# Patient Record
Sex: FEMALE | Race: WHITE | NOT HISPANIC OR LATINO | Employment: UNEMPLOYED | URBAN - METROPOLITAN AREA
[De-identification: names, ages, dates, MRNs, and addresses within clinical notes are randomized per-mention and may not be internally consistent; named-entity substitution may affect disease eponyms.]

---

## 2017-01-16 ENCOUNTER — OFFICE VISIT (OUTPATIENT)
Dept: UROLOGY | Facility: CLINIC | Age: 42
End: 2017-01-16
Payer: OTHER GOVERNMENT

## 2017-01-16 VITALS
HEART RATE: 72 BPM | DIASTOLIC BLOOD PRESSURE: 70 MMHG | BODY MASS INDEX: 38.98 KG/M2 | WEIGHT: 220 LBS | RESPIRATION RATE: 16 BRPM | SYSTOLIC BLOOD PRESSURE: 110 MMHG | HEIGHT: 63 IN

## 2017-01-16 DIAGNOSIS — N20.0 NEPHROLITHIASIS: Primary | ICD-10-CM

## 2017-01-16 DIAGNOSIS — N32.89 BLADDER SPASM: ICD-10-CM

## 2017-01-16 DIAGNOSIS — N39.0 RECURRENT UTI: ICD-10-CM

## 2017-01-16 LAB
BILIRUB SERPL-MCNC: ABNORMAL MG/DL
BLOOD URINE, POC: ABNORMAL
COLOR, POC UA: ABNORMAL
GLUCOSE UR QL STRIP: 1000
KETONES UR QL STRIP: ABNORMAL
LEUKOCYTE ESTERASE URINE, POC: ABNORMAL
NITRITE, POC UA: ABNORMAL
PH, POC UA: 6
PROTEIN, POC: ABNORMAL
SPECIFIC GRAVITY, POC UA: 1020
UROBILINOGEN, POC UA: ABNORMAL

## 2017-01-16 PROCEDURE — 99999 PR PBB SHADOW E&M-EST. PATIENT-LVL III: CPT | Mod: PBBFAC,,, | Performed by: UROLOGY

## 2017-01-16 PROCEDURE — 99214 OFFICE O/P EST MOD 30 MIN: CPT | Mod: S$PBB,,, | Performed by: UROLOGY

## 2017-01-16 PROCEDURE — 81002 URINALYSIS NONAUTO W/O SCOPE: CPT | Mod: PBBFAC | Performed by: UROLOGY

## 2017-01-16 PROCEDURE — 99213 OFFICE O/P EST LOW 20 MIN: CPT | Mod: PBBFAC | Performed by: UROLOGY

## 2017-01-16 RX ORDER — BLOOD-GLUCOSE METER
KIT MISCELLANEOUS
COMMUNITY
Start: 2017-01-08

## 2017-01-16 RX ORDER — ZOLPIDEM TARTRATE 5 MG/1
TABLET ORAL
COMMUNITY
Start: 2017-01-06

## 2017-01-16 NOTE — PROGRESS NOTES
Subjective:       Sterling Joshi is a 41 y.o. female who is an established patient who was self-referred for evaluation of nephrolithiasis.      She was seen in the ER in 10/16. CT showed punctate b/l stones - no obstruction. She reports long history of stones that has required multiple ESWL x 6 and two URS in the past for stones. Last procedure was 2 years ago (2014). She has passed many other stones spontaneously. She started making stones at age 16. She has undergone workup for stone formation in the past per her report. She took HCTZ in the past but caused dehydration and glucose issues.     She has multiple other complaints today including slow urine stream, recurrent UTIs, and possible underactive bladder. She was given Detrol by previous urologist but did not take this as she is concerned about underactivity, not OAB. She reported slower stream when she took this. She feels that she has cramping pain for last 4-5 months. She admits to straining with urination. Denies frequency. She has urgency but small volume urine. Nocturia x 1. Denies constipation.       She was given trial of Ditropan XL 5mg at initial visit for bladder spasms but did not note improvement of symptoms.  Her main complaint is cramping abdominal pain. She is understandably frustrated that no cause has been found. She was seen in ER 12/16 - UCx negative, CT with mild L hydro.     She has DM1 and is on insulin pump. Recent A1c - 7 per pt report.     PVR (bladder scan) initial visit - 0cc  PVR (bladder scan) today - 26cc        The following portions of the patient's history were reviewed and updated as appropriate: allergies, current medications, past family history, past medical history, past social history, past surgical history and problem list.    Review of Systems  Constitutional: no fever or chills  ENT: no nasal congestion or sore throat  Respiratory: no cough or shortness of breath  Cardiovascular: no chest pain or  "palpitations  Gastrointestinal: no nausea or vomiting, tolerating diet  Genitourinary: as per HPI  Hematologic/Lymphatic: no easy bruising or lymphadenopathy  Musculoskeletal: no arthralgias or myalgias  Skin: no rashes or lesions  Neurological: no seizures or tremors  Behavioral/Psych: no auditory or visual hallucinations       Objective:    Vitals:   Visit Vitals    /70    Pulse 72    Resp 16    Ht 5' 3" (1.6 m)    Wt 99.8 kg (220 lb)    BMI 38.97 kg/m2       Physical Exam   General: well developed, well nourished in no acute distress  Head: normocephalic, atraumatic  Neck: supple, trachea midline, no obvious enlargement of thyroid  HEENT: EOMI, mucus membranes moist, sclera anicteric, no hearing impairment  Lungs: symmetric expansion, non-labored breathing  Cardiovascular: regular rate and rhythm, normal pulses  Abdomen: soft, non tender, non distended, no palpable masses, no hepatosplenomegaly, no hernias, no CVA tenderness  Musculoskeletal: no peripheral edema, normal ROM in bilateral upper and lower extremities  Lymphatics: no cervical or inguinal lymphadenopathy  Skin: no rashes or lesions  Neuro: alert and oriented x 3, no gross deficits  Psych: normal judgment and insight, normal mood/affect and non-anxious  Genitourinary:   patient declined exam      Lab Review   Urine analysis today in clinic shows positive for glucose 1000    Lab Results   Component Value Date    WBC 8.83 12/21/2016    HGB 15.4 12/21/2016    HCT 45.5 12/21/2016    MCV 87 12/21/2016     12/21/2016     Lab Results   Component Value Date    CREATININE 0.9 12/21/2016    BUN 11 12/21/2016         Imaging  Images and reports were personally reviewed by me and discussed with patient  CT reviewed       Assessment/Plan:      1. Nephrolithiasis    - Long history of stones   - Punctate stones on recent CT 10/16   - Recommend q6mth KUB for now   - Consider repeat 24 hour urine     2. Bladder spasm    - Unsure etiology of pain   - " Related to urgency   - PVR great - 0cc   - Trial of Ditropan XL 5mg for possible spasms - no improvement   - Slow stream - recommend UDS   - Uribel/Cystex not helpful   - Pain in SPT and flank are main complaint now   - Cystoscopy     3. Recurrent UTI    - Tight glucose control to prevent glucosuria   - No significant stones currently acting as nidus   - UA clear today (besides glucose)   - UCx with symptoms        Follow up in 3-4 weeks for cystoscopy

## 2017-01-16 NOTE — MR AVS SNAPSHOT
[facility]  [address] [place] [address] [address]  [place] [address]  Phone: [phone]                  Sterling Joshi   2017 2:20 PM   Office Visit    Description:  Female : 1975   Provider:  Meagan Sun MD   Department:  [facility]           Reason for Visit     Follow-up           Diagnoses this Visit        Comments    Nephrolithiasis    -  Primary     Bladder spasm         Recurrent UTI                To Do List           Goals (5 Years of Data)     None      Follow-Up and Disposition     Return in about 4 weeks (around 2017) for Cystoscopy.      [facility]     [facility] Care [facility] -  Assistance  Registered nurses in the [facility] provide clinical advisement, health education, appointment booking, and other advisory services.  Call for this free service at [phone].             Medications           Message regarding Medications     Verify the changes and/or additions to your medication regime listed below are the same as discussed with your clinician today.  If any of these changes or additions are incorrect, please notify your healthcare provider.        STOP taking these medications     BELSOMRA 10 mg Tab            Verify that the below list of medications is an accurate representation of the medications you are currently taking.  If none reported, the list may be blank. If incorrect, please contact your healthcare provider. Carry this list with you in case of emergency.           Current Medications     (No Medication Selected) Inject into the skin continuous. NovoLog insulin    dicyclomine (BENTYL) 20 mg tablet Take 1 tablet (20 mg total) by mouth 2 (two) times daily as needed.    FREESTYLE LITE STRIPS Strp     gabapentin (NEURONTIN) 600 MG tablet Take 300 mg by mouth 3 (three) times daily.    hydrocodone-acetaminophen 5-325mg (NORCO) 5-325 mg per tablet Take 1 tablet by mouth every 6 (six) hours as needed for Pain. Please  "do not drink alcohol, drive, operate heavy machinery, work or swim while taking this medication as it can cause mental impairment.    ibuprofen (ADVIL,MOTRIN) 800 MG tablet     insulin aspart (NOVOLOG) 100 unit/mL injection Inject 5 Units into the skin 3 (three) times daily before meals.    levothyroxine (SYNTHROID) 112 MCG tablet Take 112 mcg by mouth 2 (two) times daily.    loratadine (CLARITIN) 10 mg tablet Take 10 mg by mouth once daily.    metoclopramide HCl (REGLAN) 10 MG tablet Take 1 tablet (10 mg total) by mouth every 6 (six) hours as needed (Nausea/vomiting).    ondansetron (ZOFRAN-ODT) 4 MG TbDL Take 2 tablets (8 mg total) by mouth every 8 (eight) hours as needed (Nausea).    oxybutynin (DITROPAN-XL) 5 MG TR24 Take 1 tablet (5 mg total) by mouth once daily.    oxycodone-acetaminophen (PERCOCET) 5-325 mg per tablet     rosuvastatin (CRESTOR) 10 MG tablet Take 10 mg by mouth once daily.    zolpidem (AMBIEN) 5 MG Tab            Clinical Reference Information           Vital Signs - Last Recorded  Most recent update: 1/16/2017  3:03 PM by Kathy Vivar MA    BP Pulse Resp Ht Wt BMI    110/70 72 16 5' 3" (1.6 m) 99.8 kg (220 lb) 38.97 kg/m2      Blood Pressure          Most Recent Value    BP  110/70      Allergies as of 1/16/2017     Codeine    Morphine    Pcn [Penicillins]    Tramadol      Immunizations Administered on Date of Encounter - 1/16/2017     None      Orders Placed During Today's Visit     Future Labs/Procedures Expected by Expires    Cystoscopy  As directed 1/16/2018      "

## 2017-02-16 ENCOUNTER — PROCEDURE VISIT (OUTPATIENT)
Dept: UROLOGY | Facility: CLINIC | Age: 42
End: 2017-02-16
Payer: OTHER GOVERNMENT

## 2017-02-16 DIAGNOSIS — N32.89 BLADDER SPASM: ICD-10-CM

## 2017-02-16 PROCEDURE — 52000 CYSTOURETHROSCOPY: CPT | Mod: PBBFAC | Performed by: UROLOGY

## 2017-02-16 NOTE — PROCEDURES
"Cystoscopy  Date/Time: 2/16/2017 12:20 PM  Performed by: MAURICIO SINGH  Authorized by: MAURICIO SINGH     Consent Done?:  Yes (Written)  Time out: Immediately prior to procedure a "time out" was called to verify the correct patient, procedure, equipment, support staff and site/side marked as required.    Indications: hematuria    Indications comment:  SP pain  Position:  Dorsal lithotomy  Anesthesia:  Lidocaine jelly  Patient sedated?: No    Preparation: Patient was prepped and draped in usual sterile fashion      Scope type:  Flexible cystoscope  Stent inserted: No    Stent removed: No    External exam normal: Yes    Digital exam performed: No    Urethra normal: Yes  Bladder neck normal: Bladder neck normal   Bladder normal: Yes      Patient tolerance:  Patient tolerated the procedure well with no immediate complications     Normal cystoscopy. Widely open urethra meatus.     Passed some small stones recently, will bring in for analysis.       "

## 2017-02-16 NOTE — MR AVS SNAPSHOT
[facility]  [address]  [address]  [place] [address]  Phone: [phone]                  Sterling Joshi   2017 11:20 AM   Procedure visit    Description:  Female : 1975   Provider:  Meagan Sun MD   Department:  [facility]           Diagnoses this Visit        Comments    Bladder spasm                To Do List           Future Appointments        Provider Department Dept Phone    2017 1:20 PM Meagan Sun MD [facility] [phone]      Goals (5 Years of Data)     None      Follow-Up and Disposition     Return in about 2 months (around 2017) for Follow up on symptoms.      [facility]     [facility] Nurse Care [facility] -  Assistance  Registered nurses in the [facility] provide clinical advisement, health education, appointment booking, and other advisory services.  Call for this free service at [phone].             Medications           Message regarding Medications     Verify the changes and/or additions to your medication regime listed below are the same as discussed with your clinician today.  If any of these changes or additions are incorrect, please notify your healthcare provider.             Verify that the below list of medications is an accurate representation of the medications you are currently taking.  If none reported, the list may be blank. If incorrect, please contact your healthcare provider. Carry this list with you in case of emergency.           Current Medications     (No Medication Selected) Inject into the skin continuous. NovoLog insulin    dicyclomine (BENTYL) 20 mg tablet Take 1 tablet (20 mg total) by mouth 2 (two) times daily as needed.    FREESTYLE LITE STRIPS Strp     gabapentin (NEURONTIN) 600 MG tablet Take 300 mg by mouth 3 (three) times daily.    hydrocodone-acetaminophen 5-325mg (NORCO) 5-325 mg per tablet Take 1 tablet by mouth every 6 (six) hours as needed for Pain.  Please do not drink alcohol, drive, operate heavy machinery, work or swim while taking this medication as it can cause mental impairment.    ibuprofen (ADVIL,MOTRIN) 800 MG tablet     insulin aspart (NOVOLOG) 100 unit/mL injection Inject 5 Units into the skin 3 (three) times daily before meals.    levothyroxine (SYNTHROID) 112 MCG tablet Take 112 mcg by mouth 2 (two) times daily.    loratadine (CLARITIN) 10 mg tablet Take 10 mg by mouth once daily.    metoclopramide HCl (REGLAN) 10 MG tablet Take 1 tablet (10 mg total) by mouth every 6 (six) hours as needed (Nausea/vomiting).    ondansetron (ZOFRAN-ODT) 4 MG TbDL Take 2 tablets (8 mg total) by mouth every 8 (eight) hours as needed (Nausea).    oxybutynin (DITROPAN-XL) 5 MG TR24 Take 1 tablet (5 mg total) by mouth once daily.    oxycodone-acetaminophen (PERCOCET) 5-325 mg per tablet     rosuvastatin (CRESTOR) 10 MG tablet Take 10 mg by mouth once daily.    zolpidem (AMBIEN) 5 MG Tab            Clinical Reference Information           Allergies as of 2/16/2017     Codeine    Morphine    Pcn [Penicillins]    Tramadol      Immunizations Administered on Date of Encounter - 2/16/2017     None      Orders Placed During Today's Visit      Normal Orders This Visit    Cystoscopy       Language Assistance Services     ATTENTION: Language assistance services are available, free of charge. Please call [phone].      ATENCIÓN: Si habla kattañol, tiene a spencer disposición servicios gratuitos de asistencia lingüística. Llame al [phone].     [facility] Ý: N?u b?n nói Ti?ng Vi?t, có các d?ch v? h? tr? ngôn ng? mi?n phí dành cho b?n. G?i s? [phone].         [facility] complies with applicable Federal civil rights laws and does not discriminate on the basis of race, color, national origin, age, disability, or sex.

## 2017-04-26 ENCOUNTER — HOSPITAL ENCOUNTER (EMERGENCY)
Facility: OTHER | Age: 42
Discharge: HOME | End: 2017-04-26
Attending: EMERGENCY MEDICINE
Payer: OTHER GOVERNMENT

## 2017-04-26 VITALS
WEIGHT: 220 LBS | SYSTOLIC BLOOD PRESSURE: 154 MMHG | OXYGEN SATURATION: 100 % | BODY MASS INDEX: 38.98 KG/M2 | TEMPERATURE: 99 F | RESPIRATION RATE: 18 BRPM | DIASTOLIC BLOOD PRESSURE: 80 MMHG | HEART RATE: 100 BPM | HEIGHT: 63 IN

## 2017-04-26 DIAGNOSIS — T81.31XA POSTOPERATIVE WOUND DEHISCENCE, INITIAL ENCOUNTER: Primary | ICD-10-CM

## 2017-04-26 LAB — POCT GLUCOSE: 235 MG/DL (ref 70–110)

## 2017-04-26 PROCEDURE — 25000003 PHARM REV CODE 250: Performed by: EMERGENCY MEDICINE

## 2017-04-26 PROCEDURE — 99284 EMERGENCY DEPT VISIT MOD MDM: CPT

## 2017-04-26 RX ORDER — HYDROCODONE BITARTRATE AND ACETAMINOPHEN 5; 325 MG/1; MG/1
1 TABLET ORAL EVERY 4 HOURS PRN
Qty: 10 TABLET | Refills: 0 | Status: SHIPPED | OUTPATIENT
Start: 2017-04-26 | End: 2017-05-06

## 2017-04-26 RX ORDER — HYDROCODONE BITARTRATE AND ACETAMINOPHEN 5; 325 MG/1; MG/1
2 TABLET ORAL
Status: COMPLETED | OUTPATIENT
Start: 2017-04-26 | End: 2017-04-26

## 2017-04-26 RX ORDER — MUPIROCIN 20 MG/G
OINTMENT TOPICAL 3 TIMES DAILY
Qty: 15 G | Refills: 0 | Status: SHIPPED | OUTPATIENT
Start: 2017-04-26 | End: 2017-05-06

## 2017-04-26 RX ORDER — SULFAMETHOXAZOLE AND TRIMETHOPRIM 800; 160 MG/1; MG/1
1 TABLET ORAL 2 TIMES DAILY
Qty: 14 TABLET | Refills: 0 | Status: SHIPPED | OUTPATIENT
Start: 2017-04-26 | End: 2017-05-03

## 2017-04-26 RX ADMIN — HYDROCODONE BITARTRATE AND ACETAMINOPHEN 2 TABLET: 5; 325 TABLET ORAL at 03:04

## 2017-04-26 NOTE — ED TRIAGE NOTES
Pt c/o abd. Pain states she had surgery last month, states she is having pain near her incision, incision on left side as dehisced

## 2017-04-26 NOTE — ED PROVIDER NOTES
Encounter Date: 4/26/2017       History     Chief Complaint   Patient presents with    Drainage from Incision     ovaries removal one month ago states wound open mid abd, drainage white with tinge of blood      Review of patient's allergies indicates:   Allergen Reactions    Codeine Nausea And Vomiting    Morphine Hives    Pcn [penicillins] Nausea And Vomiting    Tramadol Itching     The history is provided by the patient.    41-year-old lady who is one month status post laparoscopic oophorectomy.  Patient says that she has 2 wounds on her abdomen that opened a few days ago.  She reports mild redness and swelling around the site and some drainage.  She is unsure whether she has fever.  Patient just got back in town from [place] today and did not contact her surgeon regarding this problem.  Patient's pain is moderate and  worsens when she moves.  She did not take anything for pain.  She denies nausea or vomiting.  No trauma.  Patient says that she has not done any heavy lifting.  Past Medical History:   Diagnosis Date    Chronic back pain     Degenerative disc disease     Diabetes mellitus     Ovarian cyst     Renal disorder     renal stones    Thyroid disease     hypothyroidism     Past Surgical History:   Procedure Laterality Date    CHOLECYSTECTOMY      HYSTERECTOMY      TONSILLECTOMY       Family History   Problem Relation Age of Onset    Diabetes Mother      Social History   Substance Use Topics    Smoking status: Former Smoker    Smokeless tobacco: None    Alcohol use No     Review of Systems   Constitutional: Negative for fever.   Gastrointestinal: Positive for abdominal pain. Negative for nausea and vomiting.   Skin: Positive for color change and wound.   All other systems reviewed and are negative.      Physical Exam   Initial Vitals   BP Pulse Resp Temp SpO2   04/26/17 1442 04/26/17 1441 04/26/17 1441 04/26/17 1441 04/26/17 1441   154/80 100 18 98.6 °F (37 °C) 100 %     Physical  Exam    Nursing note and vitals reviewed.  Constitutional: She appears well-developed and well-nourished. She is Obese . She appears distressed.   HENT:   Head: Normocephalic and atraumatic.   Eyes: Conjunctivae and EOM are normal. Pupils are equal, round, and reactive to light.   Neck: Normal range of motion. Neck supple.   Cardiovascular: Normal rate and regular rhythm.   Pulmonary/Chest: Breath sounds normal.   Abdominal: Soft. There is no tenderness. There is no rebound and no guarding.       Musculoskeletal: Normal range of motion.   Neurological: She is alert and oriented to person, place, and time. She has normal strength.   Skin: Skin is warm and dry.   Psychiatric: She has a normal mood and affect.         ED Course   Procedures  Labs Reviewed   POCT GLUCOSE MONITORING CONTINUOUS             Medical Decision Making:   Initial Assessment:   41-year-old who complains of drainage and opening of her laparoscopic wounds.  On exam patient does have 2 areas of wound dehiscence without purulent drainage or fluctuance.  She is afebrile.  ED Management:  Accu-Chek will be done.  Patient will be treated with hydrocodone in the ED.  If her blood sugar is within a reasonable level,she will be discharged with hydrocodone, Bactroban and Bactrim.  Patient should follow-up with her surgeon 1-2 days.    Accucheck 235. Afebrile                   ED Course     Clinical Impression:   The encounter diagnosis was Postoperative wound dehiscence, initial encounter.          Oly Ramirez MD  04/26/17 1448